# Patient Record
Sex: FEMALE | Race: WHITE | URBAN - METROPOLITAN AREA
[De-identification: names, ages, dates, MRNs, and addresses within clinical notes are randomized per-mention and may not be internally consistent; named-entity substitution may affect disease eponyms.]

---

## 2017-08-20 ENCOUNTER — HOSPITAL ENCOUNTER (EMERGENCY)
Facility: CLINIC | Age: 22
Discharge: HOME | End: 2017-08-20
Attending: NURSE PRACTITIONER | Admitting: NURSE PRACTITIONER
Payer: COMMERCIAL

## 2017-08-20 VITALS
HEART RATE: 87 BPM | HEIGHT: 63 IN | SYSTOLIC BLOOD PRESSURE: 115 MMHG | DIASTOLIC BLOOD PRESSURE: 86 MMHG | TEMPERATURE: 98.6 F | RESPIRATION RATE: 16 BRPM | WEIGHT: 115 LBS | BODY MASS INDEX: 20.38 KG/M2 | OXYGEN SATURATION: 97 %

## 2017-08-20 DIAGNOSIS — T19.2XXA FOREIGN BODY IN VAGINA, INITIAL ENCOUNTER: ICD-10-CM

## 2017-08-20 PROCEDURE — 99284 EMERGENCY DEPT VISIT MOD MDM: CPT

## 2017-08-20 NOTE — ED AVS SNAPSHOT
Emergency Department    [address] [place] [id number]    Phone:  [phone]    Fax:  [phone]                                       Cookie Mallory   MRN: [id number]    Department:   Emergency Department   Date of Visit:  8/20/2017           Patient Information     Date Of Birth          1995        Your diagnoses for this visit were:     Foreign body in vagina, initial encounter        You were seen by Sandra Juarez APRN CNP.      Follow-up Information     Follow up with Your Clinic.    Why:  As needed        Discharge Instructions         Vaginal Foreign Body, Removed (Adult)    Any object placed inside the vagina is called a vaginal foreign body. This includes tampons, birth control devices, and sex toys. In some cases, objects not designed for the vagina may be placed inside.  If an object is left inside the vagina too long or becomes stuck, it can cause symptoms over time. It can also lead to infection and damage nearby tissues.  Symptoms can include unusual or foul-smelling discharge. Bleeding, redness, swelling, or rash may also occur. Some women may feel pain or pressure in or around the vagina.  Treatment involves removing the object. Once the object is removed, symptoms should go away. If the object caused an infection, antibiotics may be given.  Home care    If you re prescribed any medicines, be sure to take them as directed.    Don t douche unless advised to by your provider.    Wait until all symptoms have gone away before having sex.    Check with your provider before using tampons. If it s OK, remember to remove each tampon you use after 6 to 8 hours.  Follow-up care  Follow up with your healthcare provider, or as advised.  When to seek medical advice  Call your healthcare provider right away if any of these occur:    Your symptoms don t improve or worsen.    You develop pain in the belly.    You have burning or pain during urination.    You have a  fever of 100.4 F (38 C) or higher, or as directed by your provider.    You feel weak, dizzy, or faint.  Date Last Reviewed: 7/30/2015 2000-2017 [employer]. [address] [place] [address], [place] [address]. All rights reserved. This information is not intended as a substitute for professional medical care. Always follow your healthcare professional's instructions.          24 Hour Appointment Hotline       To make an appointment at any [facility], call [phone] ([phone]). If you don't have a family doctor or clinic, we will help you find one. [facility] clinics are conveniently located to serve the needs of you and your family.             Review of your medicines      Notice     You have not been prescribed any medications.            Orders Needing Specimen Collection     None      Pending Results     No orders found from 8/18/2017 to 8/21/2017.            Pending Culture Results     No orders found from 8/18/2017 to 8/21/2017.            Pending Results Instructions     If you had any lab results that were not finalized at the time of your Discharge, you can call the ED Lab Result RN at [phone]. You will be contacted by this team for any positive Lab results or changes in treatment. The nurses are available 7 days a week from 10A to 6:30P.  You can leave a message 24 hours per day and they will return your call.        Test Results From Your Hospital Stay               Clinical Quality Measure: Blood Pressure Screening     Your blood pressure was checked while you were in the emergency department today. The last reading we obtained was  BP: 115/86 . Please read the guidelines below about what these numbers mean and what you should do about them.  If your systolic blood pressure (the top number) is less than 120 and your diastolic blood pressure (the bottom number) is less than 80, then your blood pressure is normal. There is nothing more that you need to do about it.  If your  "systolic blood pressure (the top number) is 120-139 or your diastolic blood pressure (the bottom number) is 80-89, your blood pressure may be higher than it should be. You should have your blood pressure rechecked within a year by a primary care provider.  If your systolic blood pressure (the top number) is 140 or greater or your diastolic blood pressure (the bottom number) is 90 or greater, you may have high blood pressure. High blood pressure is treatable, but if left untreated over time it can put you at risk for heart attack, stroke, or kidney failure. You should have your blood pressure rechecked by a primary care provider within the next 4 weeks.  If your provider in the emergency department today gave you specific instructions to follow-up with your doctor or provider even sooner than that, you should follow that instruction and not wait for up to 4 weeks for your follow-up visit.        Thank you for choosing [facility]       Thank you for choosing [facility] for your care. Our goal is always to provide you with excellent care. Hearing back from our patients is one way we can continue to improve our services. Please take a few minutes to complete the written survey that you may receive in the mail after you visit with us. Thank you!        [employer] Information     [employer] lets you send messages to your doctor, view your test results, renew your prescriptions, schedule appointments and more. To sign up, go to [email or web address] . Click on \"Log in\" on the left side of the screen, which will take you to the Welcome page. Then click on \"Sign up Now\" on the right side of the page.     You will be asked to enter the access code listed below, as well as some personal information. Please follow the directions to create your username and password.     Your access code is: [email or web address]  Expires: 2017  6:21 PM     Your access code will  in 90 days. If you need help or a new code, please call your [facility] or [phone].        Care EveryWhere ID     This is your Care EveryWhere ID. This could be used by other organizations to access your [facility] medical records  [id number]        Equal Access to Services     MEHREEN PULIDO : Jesika La, waaxda luqadaha, qaybta kaalmada juan jose, stefani li. So [facility] [phone].    ATENCIÓN: Si habla español, tiene a becerra disposición servicios gratuitos de asistencia lingüística. Llame al [phone].    We comply with applicable federal civil rights laws and [place] laws. We do not discriminate on the basis of race, color, national origin, age, disability sex, sexual orientation or gender identity.            After Visit Summary       This is your record. Keep this with you and show to your community pharmacist(s) and doctor(s) at your next visit.

## 2017-08-20 NOTE — ED PROVIDER NOTES
"  History     Chief Complaint:  Foreign body in vagina    HPI   Cookie Mallory is a 21 year old female who presents with her mother to the ED for a foreign body in the vagina. The patient reports that she was putting in a tampon just prior to arrival and as she was removing the applicator, the string was attached and thus the tampon became stuck. The patient denies any pain or other symptoms. Because she was unable to remove the tampon on her own, she presents to the ED for further evaluation.    Allergies:  No known drug allergies    Medications:    The patient is not currently taking any prescribed medications.    Past Medical History:    The patient does not have any past pertinent medical history.    Past Surgical History:    History reviewed. No pertinent surgical history.    Family History:    History reviewed. No pertinent family history.     Social History:  Smoking status: Never  Alcohol use: No  Marital Status:  Single     Review of Systems   Genitourinary: Negative for vaginal pain.   All other systems reviewed and are negative.    Physical Exam   Patient Vitals for the past 24 hrs:   BP Temp Temp src Pulse Resp SpO2 Height Weight   08/20/17 1813 115/86 98.6  F (37  C) Oral 87 16 97 % 1.6 m (5' 3\") 52.2 kg (115 lb)        Physical Exam  Physical Exam   Constitutional:  Well-appearing.  Head: Head moves freely with normal range of motion.   Eyes: Conjunctivae pink. EOMs intact.   Neck: Normal range of motion.   Cardiovascular: Intact distal pulses.  Pulmonary/Chest: No respiratory distress.   Abdomen: Soft, nontender. No rebound. No gaurding.     : Tampon noted in vaginal vault. Trace amount of bleeding noted, pt with menses currently. Tampon easily removed with ring forceps.  Musculoskeletal: Moves all extremities.   Neurological: Oriented to person, place, and time.       Emergency Department Course     Procedures:    Foreign Body Removal        LOCATION:  Vagina      FOREIGN BODY: " Tampon    PROCEDURE: The tampon was grasped using ring forceps and the foreign body was successfully removed. There were no immediate complications. The patient tolerated the procedure well.     Emergency Department Course:  Past medical records, nursing notes, and vitals reviewed.  1804: I performed an exam of the patient as documented above. Clinical findings and plan explained to the Patient. Patient discharged home with instructions regarding supportive care, medications, and reasons to return as well as the importance of close follow-up were reviewed.     Impression & Plan      Medical Decision Making:  Pt put a tampon in shortly before coming here and the string came out with the applicator. She was unable to remove the tampon at home. I easily removed the tampon with ring forceps here today. We discussed reasons to return here. Pt amenable to plan.       Diagnosis:    ICD-10-CM   1. Foreign body in vagina, initial encounter T19.2XXA       Disposition:  discharged to home.    Chelsea Deborah  8/20/2017    EMERGENCY DEPARTMENT  I, Chelsea Cabrera, am serving as a scribe at 6:04 PM on 8/20/2017 to document services personally performed by Sandra Juarez CNP based on my observations and the provider's statements to me.        Sandra Juarez APRN CNP  08/20/17 1838

## 2017-08-20 NOTE — DISCHARGE INSTRUCTIONS
Vaginal Foreign Body, Removed (Adult)    Any object placed inside the vagina is called a vaginal foreign body. This includes tampons, birth control devices, and sex toys. In some cases, objects not designed for the vagina may be placed inside.  If an object is left inside the vagina too long or becomes stuck, it can cause symptoms over time. It can also lead to infection and damage nearby tissues.  Symptoms can include unusual or foul-smelling discharge. Bleeding, redness, swelling, or rash may also occur. Some women may feel pain or pressure in or around the vagina.  Treatment involves removing the object. Once the object is removed, symptoms should go away. If the object caused an infection, antibiotics may be given.  Home care    If you re prescribed any medicines, be sure to take them as directed.    Don t douche unless advised to by your provider.    Wait until all symptoms have gone away before having sex.    Check with your provider before using tampons. If it s OK, remember to remove each tampon you use after 6 to 8 hours.  Follow-up care  Follow up with your healthcare provider, or as advised.  When to seek medical advice  Call your healthcare provider right away if any of these occur:    Your symptoms don t improve or worsen.    You develop pain in the belly.    You have burning or pain during urination.    You have a fever of 100.4 F (38 C) or higher, or as directed by your provider.    You feel weak, dizzy, or faint.  Date Last Reviewed: 7/30/2015 2000-2017 [employer]. [address] [place] [address], [place] [address]. All rights reserved. This information is not intended as a substitute for professional medical care. Always follow your healthcare professional's instructions.
